# Patient Record
Sex: FEMALE | Race: WHITE
[De-identification: names, ages, dates, MRNs, and addresses within clinical notes are randomized per-mention and may not be internally consistent; named-entity substitution may affect disease eponyms.]

---

## 2018-12-17 ENCOUNTER — HOSPITAL ENCOUNTER (EMERGENCY)
Dept: HOSPITAL 74 - JER | Age: 38
Discharge: HOME | End: 2018-12-17
Payer: COMMERCIAL

## 2018-12-17 VITALS — SYSTOLIC BLOOD PRESSURE: 102 MMHG | HEART RATE: 74 BPM | DIASTOLIC BLOOD PRESSURE: 62 MMHG | TEMPERATURE: 98.6 F

## 2018-12-17 VITALS — BODY MASS INDEX: 22.8 KG/M2

## 2018-12-17 DIAGNOSIS — R10.33: Primary | ICD-10-CM

## 2018-12-17 LAB
ALBUMIN SERPL-MCNC: 3.6 G/DL (ref 3.4–5)
ALP SERPL-CCNC: 68 U/L (ref 45–117)
ALT SERPL-CCNC: 22 U/L (ref 13–61)
ANION GAP SERPL CALC-SCNC: 6 MMOL/L (ref 8–16)
APPEARANCE UR: (no result)
AST SERPL-CCNC: 19 U/L (ref 15–37)
BACTERIA #/AREA URNS HPF: (no result) /HPF
BASOPHILS # BLD: 0.1 % (ref 0–2)
BILIRUB SERPL-MCNC: 0.4 MG/DL (ref 0.2–1)
BILIRUB UR STRIP.AUTO-MCNC: NEGATIVE MG/DL
BUN SERPL-MCNC: 15 MG/DL (ref 7–18)
CALCIUM SERPL-MCNC: 8.3 MG/DL (ref 8.5–10.1)
CHLORIDE SERPL-SCNC: 105 MMOL/L (ref 98–107)
CO2 SERPL-SCNC: 25 MMOL/L (ref 21–32)
COLOR UR: (no result)
CREAT SERPL-MCNC: 0.6 MG/DL (ref 0.55–1.3)
DEPRECATED RDW RBC AUTO: 13.2 % (ref 11.6–15.6)
EOSINOPHIL # BLD: 0.7 % (ref 0–4.5)
EPITH CASTS URNS QL MICRO: (no result) /HPF
GLUCOSE SERPL-MCNC: 90 MG/DL (ref 74–106)
HCT VFR BLD CALC: 40.9 % (ref 32.4–45.2)
HGB BLD-MCNC: 14.3 GM/DL (ref 10.7–15.3)
INR BLD: 1.08 (ref 0.83–1.09)
KETONES UR QL STRIP: NEGATIVE
LEUKOCYTE ESTERASE UR QL STRIP.AUTO: (no result)
LIPASE SERPL-CCNC: 161 U/L (ref 73–393)
LYMPHOCYTES # BLD: 5.3 % (ref 8–40)
MCH RBC QN AUTO: 30.6 PG (ref 25.7–33.7)
MCHC RBC AUTO-ENTMCNC: 34.8 G/DL (ref 32–36)
MCV RBC: 88 FL (ref 80–96)
MONOCYTES # BLD AUTO: 4 % (ref 3.8–10.2)
MUCOUS THREADS URNS QL MICRO: (no result)
NEUTROPHILS # BLD: 89.9 % (ref 42.8–82.8)
NITRITE UR QL STRIP: NEGATIVE
PH UR: 5 [PH] (ref 5–8)
PLATELET # BLD AUTO: 247 K/MM3 (ref 134–434)
PMV BLD: 8.4 FL (ref 7.5–11.1)
POTASSIUM SERPLBLD-SCNC: 4 MMOL/L (ref 3.5–5.1)
PROT SERPL-MCNC: 7.2 G/DL (ref 6.4–8.2)
PROT UR QL STRIP: NEGATIVE
PROT UR QL STRIP: NEGATIVE
PT PNL PPP: 12.7 SEC (ref 9.7–13)
RBC # BLD AUTO: 4.66 M/MM3 (ref 3.6–5.2)
SODIUM SERPL-SCNC: 137 MMOL/L (ref 136–145)
SP GR UR: 1.01 (ref 1.01–1.03)
UROBILINOGEN UR STRIP-MCNC: NEGATIVE MG/DL (ref 0.2–1)
WBC # BLD AUTO: 14 K/MM3 (ref 4–10)

## 2018-12-17 NOTE — PDOC
History of Present Illness





- General


Chief Complaint: Pain, Acute


Stated Complaint: PCP SENT


Time Seen by Provider: 12/17/18 12:31


History Source: Patient





- History of Present Illness


Timing/Duration: reports: other (this am)


Quality: reports: severe


Abdominal Pain Onset Location: reports: periumbilical





Past History





- Past Medical History


Allergies/Adverse Reactions: 


 Allergies











Allergy/AdvReac Type Severity Reaction Status Date / Time


 


No Known Allergies Allergy   Verified 01/10/15 09:34











Home Medications: 


Ambulatory Orders





NK [No Known Home Medication]  01/10/15 








COPD: No


Diabetes: No





- Immunization History


Immunization Up to Date: Yes





- Suicide/Smoking/Psychosocial Hx


Smoking History: Never smoked


Have you smoked in the past 12 months: No


Information on smoking cessation initiated: No


'Breaking Loose' booklet given: 01/10/15


Hx Alcohol Use: No


Drug/Substance Use Hx: No





**Review of Systems





- Review of Systems


Constitutional: No: Fever


ABD/GI: Yes: Nausea, Vomiting, Abdominal cramping.  No: Constipated, Diarrhea, 

Rectal Bleeding, Tarry Stools


: No: Dysuria, Flank Pain





*Physical Exam





- Vital Signs


 Last Vital Signs











Temp Pulse Resp BP Pulse Ox


 


 98.6 F   74   18   102/62   100 


 


 12/17/18 12:24  12/17/18 12:24  12/17/18 12:24  12/17/18 12:24  12/17/18 12:24














- Physical Exam


General Appearance: Yes: Appropriately Dressed.  No: Apparent Distress


HEENT: positive: Normal Voice


Neck: positive: Supple


Gastrointestinal/Abdominal: positive: Normal Bowel Sounds, Tender (poorly 

localized), Soft.  negative: Pulsatile Mass, Distended, Guarding, Rebound, 

Hernia


Musculoskeletal: negative: CVA Tenderness


Integumentary: positive: Dry, Warm


Neurologic: positive: Fully Oriented, Alert, Normal Mood/Affect





Moderate Sedation





- Procedure Monitoring


Vital Signs: 


Procedure Monitoring Vital Signs











Temperature  98.6 F   12/17/18 12:24


 


Pulse Rate  74   12/17/18 12:24


 


Respiratory Rate  18   12/17/18 12:24


 


Blood Pressure  102/62   12/17/18 12:24


 


O2 Sat by Pulse Oximetry (%)  100   12/17/18 12:24











ED Treatment Course





- LABORATORY


CBC & Chemistry Diagram: 


 12/17/18 13:02





 12/17/18 13:02





Medical Decision Making





- Medical Decision Making





12/17/18 12:35





39 yo F, umbilical hernia, p/w abd pain. Pt states this am, she developed 

severe periumbilical abd pain this am that has since improved. Had 1 e/o n/v 

per pt. No diarrhea, constipation, dysuria, vaginal discharge, f/c. No h/o 

similar pain. Seen in UC and by her PMD this am who referred her to ED for 

possible CT scan





See exam





Periumbilical pain


Unclear etiology at this time, no e/o incarcerated hernia, NT over appendix, r/

o uti, r/o preg


Exam unremarkable


-labs


-ua


-CT








12/17/18 13:46


Labs 14 w/ 3+ LE on ua, 13 wbc and no nitrite. Pt reports no dysuria, hematuria

, flank pain, f/c so will hold off on abx and send ucx. CT read as no appy, 

there is a R ovarian follicle, b/l non-obs renal stones and mod fecal 

impaction. Pt informed of results. Currently has no sxs and has remained well 

edgardo throughout ED visit. Will dc w/ otc stools softener prn and have pt f/u 

with pmd








*DC/Admit/Observation/Transfer


Diagnosis at time of Disposition: 


Abdominal pain


Qualifiers:


 Abdominal location: periumbilical Qualified Code(s): R10.33 - Periumbilical 

pain








- Discharge Dispostion


Disposition: HOME


Condition at time of disposition: Improved





- Referrals


Referrals: 


Chintan Dubose MD [Primary Care Provider] - 





- Patient Instructions


Printed Discharge Instructions:  DI for Constipation


Additional Instructions: 


Your CT scan shoed no appendicitis but shows some constipation


Please take over the counter stool softener as needed for constipation


Drink plenty fluids and increase fiber in diet


Continue to follow up with your pmd





- Post Discharge Activity